# Patient Record
Sex: MALE | Race: WHITE | NOT HISPANIC OR LATINO | Employment: FULL TIME | URBAN - METROPOLITAN AREA
[De-identification: names, ages, dates, MRNs, and addresses within clinical notes are randomized per-mention and may not be internally consistent; named-entity substitution may affect disease eponyms.]

---

## 2017-12-18 ENCOUNTER — OFFICE VISIT (OUTPATIENT)
Dept: ORTHOPEDIC SURGERY | Facility: CLINIC | Age: 67
End: 2017-12-18

## 2017-12-18 VITALS
SYSTOLIC BLOOD PRESSURE: 127 MMHG | HEART RATE: 64 BPM | BODY MASS INDEX: 29.35 KG/M2 | DIASTOLIC BLOOD PRESSURE: 80 MMHG | HEIGHT: 70 IN | WEIGHT: 205 LBS

## 2017-12-18 DIAGNOSIS — R52 PAIN: Primary | ICD-10-CM

## 2017-12-18 DIAGNOSIS — M70.61 TROCHANTERIC BURSITIS OF RIGHT HIP: ICD-10-CM

## 2017-12-18 PROCEDURE — 99203 OFFICE O/P NEW LOW 30 MIN: CPT | Performed by: ORTHOPAEDIC SURGERY

## 2017-12-18 PROCEDURE — 20610 DRAIN/INJ JOINT/BURSA W/O US: CPT | Performed by: ORTHOPAEDIC SURGERY

## 2017-12-18 PROCEDURE — 72170 X-RAY EXAM OF PELVIS: CPT | Performed by: ORTHOPAEDIC SURGERY

## 2017-12-18 RX ORDER — TRIAMCINOLONE ACETONIDE 40 MG/ML
40 INJECTION, SUSPENSION INTRA-ARTICULAR; INTRAMUSCULAR
Status: COMPLETED | OUTPATIENT
Start: 2017-12-18 | End: 2017-12-18

## 2017-12-18 RX ORDER — LIDOCAINE HYDROCHLORIDE 10 MG/ML
4 INJECTION, SOLUTION INFILTRATION; PERINEURAL
Status: COMPLETED | OUTPATIENT
Start: 2017-12-18 | End: 2017-12-18

## 2017-12-18 RX ORDER — MELOXICAM 7.5 MG/1
7.5 TABLET ORAL DAILY
Qty: 30 TABLET | Refills: 5 | Status: SHIPPED | OUTPATIENT
Start: 2017-12-18 | End: 2022-08-17

## 2017-12-18 RX ADMIN — LIDOCAINE HYDROCHLORIDE 4 ML: 10 INJECTION, SOLUTION INFILTRATION; PERINEURAL at 14:48

## 2017-12-18 RX ADMIN — TRIAMCINOLONE ACETONIDE 40 MG: 40 INJECTION, SUSPENSION INTRA-ARTICULAR; INTRAMUSCULAR at 14:48

## 2017-12-18 NOTE — PROGRESS NOTES
Subjective: Right hip pain     Patient ID: Gilbert Jimenez is a 67 y.o. male.    Chief Complaint:    History of Present Illness 67-year-old male presents evaluation of his right hip is been symptomatic for the past several months.  No history of trauma but is noted persistent and recurring pain in the knee particularly lies on that right side with sleeping and with heavy activity.  Presents consistent concerned that he may have arthritis of the hip as he had a younger brother that both hips were placed.  He describes the pain as 2-4 out of 10 and doesn't jude some activities of daily living but is not totally disabled.  He is only taking occasional aspirin but no other medication for the discomfort       Social History     Occupational History   • Not on file.     Social History Main Topics   • Smoking status: Former Smoker     Quit date: 1975   • Smokeless tobacco: Never Used   • Alcohol use 2.4 oz/week     4 Cans of beer per week      Comment: daily   • Drug use: No   • Sexual activity: Defer      Review of Systems   Constitutional: Negative for chills, diaphoresis, fever and unexpected weight change.   HENT: Positive for tinnitus. Negative for hearing loss, nosebleeds and sore throat.    Eyes: Negative for pain and visual disturbance.   Respiratory: Negative for cough, shortness of breath and wheezing.    Cardiovascular: Positive for chest pain. Negative for palpitations.   Gastrointestinal: Negative for abdominal pain, diarrhea, nausea and vomiting.   Endocrine: Negative for cold intolerance, heat intolerance and polydipsia.   Genitourinary: Negative for difficulty urinating, dysuria and hematuria.   Musculoskeletal: Positive for arthralgias and myalgias. Negative for joint swelling.   Skin: Negative for rash and wound.   Allergic/Immunologic: Negative for environmental allergies.   Neurological: Negative for dizziness, syncope and numbness.   Hematological: Does not bruise/bleed easily.    Psychiatric/Behavioral: Negative for dysphoric mood and sleep disturbance. The patient is not nervous/anxious.          Past Medical History:   Diagnosis Date   • Fatigue      Past Surgical History:   Procedure Laterality Date   • KNEE ARTHROSCOPY Left 1988     Family History   Problem Relation Age of Onset   • Cancer Mother 77   • Cancer Father 66   • No Known Problems Brother    • No Known Problems Daughter    • No Known Problems Daughter          Objective:  Vitals:    12/18/17 1417   BP: 127/80   Pulse: 64     Last 3 weights    12/18/17  1417   Weight: 93 kg (205 lb)     Body mass index is 29.41 kg/(m^2).       Ortho Exam  an AP of the pelvis to evaluate his chief complaints completely within normal limits.  No prior x-rays for comparison.  There is alert and oriented ×3.  Head is normocephalic and sclerae are clear.  The right leg has no motor deficit with good distal pulses and no sensory loss.  There is a negative Stinchfield test.  No pain with passive internal/external rotation of the hip.  Negative straight leg raise sign.  Jaylyn test is negative.  His calf is nontender negative Homans.  Hip abductors are 5 over 5 but there is tenderness over the greater trochanter with abduction against resistance.  His skin is cool to touch and there is no warmth or erythema to palpation.  He is taking aspirin no GI side effects but is not taking any other anti-inflammatory medication or a nonsteroidal on a regular basis.    Assessment:       1. Pain    2. Trochanteric bursitis of right hip          Plan:Reviewed the x-rays and physical findings with the patient.  I assured him this is not osteoarthritis of his hip with trochanteric bursitis and I had a model in the room showing him the anatomy describing pathology involved.  Recommend beginning meloxicam on a daily basis and also after treatment options were proceed with a cortisone injection.  Patient rather try cortisone to try physical therapy.  Therefore after  sterile prep and underwent injection of 4 cc of lidocaine 1 cc Kenalog.  Postinjection instructions given to the patient.  Return to see me in 4 weeks.  Large Joint Arthrocentesis  Date/Time: 12/18/2017 2:48 PM  Consent given by: patient  Site marked: site marked  Timeout: Immediately prior to procedure a time out was called to verify the correct patient, procedure, equipment, support staff and site/side marked as required   Supporting Documentation  Indications: pain   Procedure Details  Location: hip - R hip joint  Preparation: Patient was prepped and draped in the usual sterile fashion  Needle size: 22 G  Approach: superior  Medications administered: 4 mL lidocaine 1 %; 40 mg triamcinolone acetonide 40 MG/ML                    Work Status:    BRITANY query complete.    Orders:  Orders Placed This Encounter   Procedures   • Large Joint Arthrocentesis   • XR Pelvis 1 or 2 View       Medications:  New Medications Ordered This Visit   Medications   • meloxicam (MOBIC) 7.5 MG tablet     Sig: Take 1 tablet by mouth Daily.     Dispense:  30 tablet     Refill:  5       Followup:  Return in about 4 weeks (around 1/15/2018).          Dragon transcription disclaimer     Much of this encounter note is an electronic transcription/translation of spoken language to printed text. The electronic translation of spoken language may permit erroneous, or at times, nonsensical words or phrases to be inadvertently transcribed. Although I have reviewed the note for such errors, some may still exist.

## 2018-01-15 ENCOUNTER — OFFICE VISIT (OUTPATIENT)
Dept: ORTHOPEDIC SURGERY | Facility: CLINIC | Age: 68
End: 2018-01-15

## 2018-01-15 VITALS — WEIGHT: 206 LBS | BODY MASS INDEX: 29.49 KG/M2 | HEIGHT: 70 IN

## 2018-01-15 DIAGNOSIS — R52 PAIN: Primary | ICD-10-CM

## 2018-01-15 DIAGNOSIS — M70.61 TROCHANTERIC BURSITIS OF RIGHT HIP: ICD-10-CM

## 2018-01-15 PROCEDURE — 99213 OFFICE O/P EST LOW 20 MIN: CPT | Performed by: ORTHOPAEDIC SURGERY

## 2018-01-15 NOTE — PROGRESS NOTES
Subjective: Right hip pain     Patient ID: Gilbert Jimenez is a 67 y.o. male.    Chief Complaint:    History of Present Illness 67-year-old male is seen back regarding his trochanteric bursitis.  Was started on meloxicam and is noticed significant reduction in his pain and discomfort on the medication       Social History     Occupational History   • Not on file.     Social History Main Topics   • Smoking status: Former Smoker     Quit date: 1975   • Smokeless tobacco: Never Used   • Alcohol use 2.4 oz/week     4 Cans of beer per week      Comment: daily   • Drug use: No   • Sexual activity: Defer      Review of Systems   Constitutional: Negative for chills, diaphoresis, fever and unexpected weight change.   HENT: Negative for hearing loss, nosebleeds, sore throat and tinnitus.    Eyes: Negative for pain and visual disturbance.   Respiratory: Negative for cough, shortness of breath and wheezing.    Cardiovascular: Negative for chest pain and palpitations.   Gastrointestinal: Negative for abdominal pain, diarrhea, nausea and vomiting.   Endocrine: Negative for cold intolerance, heat intolerance and polydipsia.   Genitourinary: Negative for difficulty urinating, dysuria and hematuria.   Musculoskeletal: Positive for arthralgias and myalgias. Negative for joint swelling.   Skin: Negative for rash and wound.   Allergic/Immunologic: Negative for environmental allergies.   Neurological: Negative for dizziness, syncope and numbness.   Hematological: Does not bruise/bleed easily.   Psychiatric/Behavioral: Negative for dysphoric mood and sleep disturbance. The patient is not nervous/anxious.          Past Medical History:   Diagnosis Date   • Fatigue      Past Surgical History:   Procedure Laterality Date   • KNEE ARTHROSCOPY Left 1988     Family History   Problem Relation Age of Onset   • Cancer Mother 77   • Cancer Father 66   • No Known Problems Brother    • No Known Problems Daughter    • No Known Problems Daughter           Objective:  There were no vitals filed for this visit.  Last 3 weights    01/15/18  1005   Weight: 93.4 kg (206 lb)     Body mass index is 29.56 kg/(m^2).       Ortho Exam  is alert and oriented ×3.  The right leg shows no motor deficit good distal pulses no sensory loss.  His calf is nontender with a negative Homans.  Hip abduction is 5 over 5 minimal tenderness over the greater trochanter.  No sensory loss.  Skin is cool to touch.  His been tolerating meloxicam without any GI side effects and with reduction of the pain and discomfort.  Is able to perform all activities of daily living without discomfort is quite pleased with the result at this point.    Assessment:       1. Pain    2. Trochanteric bursitis of right hip          Plan:      patient was advised to continue taking the meloxicam on a daily basis.  Return to see me when and if the pain should return despite taking meloxicam at that point he be a candidate for cortisone injection.  Discussed this treatment plan with the patient and he was in agreement.      Work Status:    BRITANY query complete.    Orders:  No orders of the defined types were placed in this encounter.      Medications:  No orders of the defined types were placed in this encounter.      Followup:  Return if symptoms worsen or fail to improve.          Dragon transcription disclaimer     Much of this encounter note is an electronic transcription/translation of spoken language to printed text. The electronic translation of spoken language may permit erroneous, or at times, nonsensical words or phrases to be inadvertently transcribed. Although I have reviewed the note for such errors, some may still exist.

## 2022-08-17 ENCOUNTER — OFFICE VISIT (OUTPATIENT)
Dept: INTERNAL MEDICINE | Facility: CLINIC | Age: 72
End: 2022-08-17

## 2022-08-17 VITALS
DIASTOLIC BLOOD PRESSURE: 80 MMHG | BODY MASS INDEX: 27.03 KG/M2 | SYSTOLIC BLOOD PRESSURE: 136 MMHG | OXYGEN SATURATION: 98 % | WEIGHT: 188.8 LBS | HEIGHT: 70 IN | TEMPERATURE: 96.8 F | HEART RATE: 65 BPM

## 2022-08-17 DIAGNOSIS — R79.9 ABNORMAL FINDING OF BLOOD CHEMISTRY, UNSPECIFIED: ICD-10-CM

## 2022-08-17 DIAGNOSIS — R53.83 OTHER FATIGUE: ICD-10-CM

## 2022-08-17 DIAGNOSIS — E66.3 OVERWEIGHT: ICD-10-CM

## 2022-08-17 DIAGNOSIS — R19.7 DIARRHEA, UNSPECIFIED TYPE: ICD-10-CM

## 2022-08-17 DIAGNOSIS — R63.4 WEIGHT LOSS, UNINTENTIONAL: Primary | ICD-10-CM

## 2022-08-17 DIAGNOSIS — L82.1 SEBORRHEIC KERATOSES: ICD-10-CM

## 2022-08-17 DIAGNOSIS — F32.1 CURRENT MODERATE EPISODE OF MAJOR DEPRESSIVE DISORDER WITHOUT PRIOR EPISODE: ICD-10-CM

## 2022-08-17 DIAGNOSIS — Z12.11 ENCOUNTER FOR SCREENING FOR MALIGNANT NEOPLASM OF COLON: ICD-10-CM

## 2022-08-17 DIAGNOSIS — R21 RASH AND NONSPECIFIC SKIN ERUPTION: ICD-10-CM

## 2022-08-17 PROCEDURE — 99205 OFFICE O/P NEW HI 60 MIN: CPT | Performed by: INTERNAL MEDICINE

## 2022-08-17 RX ORDER — ASPIRIN 81 MG/1
81 TABLET, CHEWABLE ORAL AS NEEDED
COMMUNITY

## 2022-08-17 RX ORDER — KETOCONAZOLE 20 MG/ML
SHAMPOO TOPICAL
Qty: 120 ML | Refills: 2 | Status: SHIPPED | OUTPATIENT
Start: 2022-08-17

## 2022-08-17 NOTE — ASSESSMENT & PLAN NOTE
- Every 1-2 weeks having rash that appears in the middle of the night. Occurring last 1 month.   - Awakens him from sleep.  Uses ice packs to provide relief, and cortisone, and benadryl tabs.   - Describes as red bumps and then areas of just erythema. Has associated burning sensation.  Usually disappears after 2-2.5 hours.  - Does have a history of skin cancer, but had removal of cancer.  No history of eczema.    - No laundry detergent changes.  No environmental changes that skin is exposed to.   - Once when rash came on, throat was contracted, but only once.   PLAN  - Will take pictures next time  - Will talk with wife about other possible triggers   - Offered prescription strength steroid, but declines today

## 2022-08-17 NOTE — ASSESSMENT & PLAN NOTE
- Some issues with making it to the bathroom  PLAN  - Cologuard ordered as no screening for colon cancer ever completed  - Fiber gummies with chicory recommended

## 2022-08-17 NOTE — ASSESSMENT & PLAN NOTE
- Gets lots of movement  - Actually concerned about unintentional weight loss  - Continue movement and will re-assess at next appointment

## 2022-08-17 NOTE — ASSESSMENT & PLAN NOTE
- Lost about 20 lbs in 1 year.  Has been having lack of appetite.  Food is not appealing.  No pain with eating.  - No stressors or infections preceding  - Early satiety, no notable bloating. No n/v/d. No night sweats.  No LAD.   - Some mild reflux symptoms   PLAN  - Lab evaluation today

## 2022-08-17 NOTE — PROGRESS NOTES
The ABCs of the Annual Wellness Visit  Subsequent Medicare Wellness Visit    Chief Complaint   Patient presents with   • Establish Care   • Annual Exam     Medicare Wellness      Subjective    History of Present Illness:  Gilbert Jimenez is a 72 y.o. male who presents for a Subsequent Medicare Wellness Visit.    Previously in the [employer] and worked at [employer] for years asa .     The following portions of the patient's history were reviewed and   updated as appropriate: allergies, current medications, past family history, past medical history, past social history, past surgical history and problem list.    Compared to one year ago, the patient feels his physical   health is the same.    Compared to one year ago, the patient feels his mental   health is worse.    Recent Hospitalizations:  He was not admitted to the hospital during the last year.       Current Medical Providers:  Patient Care Team:  Lorene Kelly MD as PCP - General (Internal Medicine)    Outpatient Medications Prior to Visit   Medication Sig Dispense Refill   • aspirin 81 MG chewable tablet Chew 81 mg Daily.     • meloxicam (MOBIC) 7.5 MG tablet Take 1 tablet by mouth Daily. 30 tablet 5     No facility-administered medications prior to visit.       No opioid medication identified on active medication list. I have reviewed chart for other potential  high risk medication/s and harmful drug interactions in the elderly.          Aspirin is not on active medication list.  Patient reports that he is taking aspirin.  .    Patient Active Problem List   Diagnosis   • Fatigue   • Chest pain   • Weight loss, unintentional   • Rash and nonspecific skin eruption   • Overweight   • Diarrhea   • Current moderate episode of major depressive disorder without prior episode (HCC)     Advance Care Planning  Advance Directive is not on file.  Patient with very complex current social set up and depression that dominated visit.  Will re-address at next  "visit.           Objective    Vitals:    22 1052   BP: 136/80   BP Location: Left arm   Patient Position: Sitting   Pulse: 65   Temp: 96.8 °F (36 °C)   SpO2: 98%   Weight: 85.6 kg (188 lb 12.8 oz)   Height: 177.8 cm (70\")     Estimated body mass index is 27.09 kg/m² as calculated from the following:    Height as of this encounter: 177.8 cm (70\").    Weight as of this encounter: 85.6 kg (188 lb 12.8 oz).    BMI is >= 25 and <30. (Overweight) The following options were offered after discussion;: Patient concerned about unintentional weight loss.       Does the patient have evidence of cognitive impairment? No    Physical Exam  Vitals reviewed.   Constitutional:       General: He is not in acute distress.     Appearance: Normal appearance.   HENT:      Head: Normocephalic and atraumatic.      Nose: Nose normal.      Mouth/Throat:      Mouth: Mucous membranes are moist.   Eyes:      Conjunctiva/sclera: Conjunctivae normal.   Cardiovascular:      Rate and Rhythm: Normal rate and regular rhythm.   Pulmonary:      Effort: Pulmonary effort is normal.      Breath sounds: Normal breath sounds.   Abdominal:      Palpations: Abdomen is soft.      Tenderness: There is no abdominal tenderness.      Comments: Difficult to tell, but thought I may have felt liver edge on palpation    Skin:     General: Skin is warm and dry.   Neurological:      General: No focal deficit present.      Mental Status: He is alert.   Psychiatric:         Mood and Affect: Mood normal.                 HEALTH RISK ASSESSMENT    Smoking Status:  Social History     Tobacco Use   Smoking Status Former Smoker   • Packs/day: 0.20   • Years: 6.00   • Pack years: 1.20   • Quit date:    • Years since quittin.6   Smokeless Tobacco Never Used     Alcohol Consumption:  Social History     Substance and Sexual Activity   Alcohol Use Yes   • Alcohol/week: 4.0 standard drinks   • Types: 4 Cans of beer per week    Comment: daily     Fall Risk " Screen:    FERNANDO Fall Risk Assessment was completed, and patient is at LOW risk for falls.Assessment completed on:8/17/2022    Depression Screening:  PHQ-2/PHQ-9 Depression Screening 8/17/2022   Little Interest or Pleasure in Doing Things 0-->not at all   Feeling Down, Depressed or Hopeless 0-->not at all   PHQ-9: Brief Depression Severity Measure Score 0       Health Habits and Functional and Cognitive Screening:  Functional & Cognitive Status 8/17/2022   Do you have difficulty preparing food and eating? No   Do you have difficulty bathing yourself, getting dressed or grooming yourself? No   Do you have difficulty using the toilet? No   Do you have difficulty moving around from place to place? No   Do you have trouble with steps or getting out of a bed or a chair? No   Current Diet Other        Current Diet Comment High protien   Dental Exam Up to date   Eye Exam Up to date   Exercise (times per week) 2 times per week   Current Exercises Include Yard Work   Do you need help using the phone?  No   Are you deaf or do you have serious difficulty hearing?  No   Do you need help with transportation? No   Do you need help shopping? No   Do you need help preparing meals?  No   Do you need help with housework?  No   Do you need help with laundry? No   Do you need help taking your medications? No   Do you need help managing money? No   Do you ever drive or ride in a car without wearing a seat belt? No   Have you felt unusual stress, anger or loneliness in the last month? Yes   Who do you live with? Spouse   If you need help, do you have trouble finding someone available to you? No   Have you been bothered in the last four weeks by sexual problems? No   Do you have difficulty concentrating, remembering or making decisions? No       Age-appropriate Screening Schedule:  Refer to the list below for future screening recommendations based on patient's age, sex and/or medical conditions. Orders for these recommended tests are  listed in the plan section. The patient has been provided with a written plan.    Health Maintenance   Topic Date Due   • TDAP/TD VACCINES (1 - Tdap) Never done   • ZOSTER VACCINE (1 of 2) Never done   • INFLUENZA VACCINE  10/01/2022              Assessment & Plan   CMS Preventative Services Quick Reference  Risk Factors Identified During Encounter  Cardiovascular Disease  Depression/Dysphoria  Inadequate Social Support, Isolation, Loneliness, Lack of Transportation, Financial Difficulties, or Caregiver Stress   The above risks/problems have been discussed with the patient.  Follow up actions/plans if indicated are seen below in the Assessment/Plan Section.  Pertinent information has been shared with the patient in the After Visit Summary.    Diagnoses and all orders for this visit:    1. Weight loss, unintentional (Primary)  Assessment & Plan:  - Lost about 20 lbs in 1 year.  Has been having lack of appetite.  Food is not appealing.  No pain with eating.  - No stressors or infections preceding  - Early satiety, no notable bloating. No n/v/d. No night sweats.  No LAD.   - Some mild reflux symptoms   PLAN  - Lab evaluation today    Orders:  -     Comprehensive metabolic panel; Future  -     CBC w AUTO Differential; Future    2. Rash and nonspecific skin eruption  Assessment & Plan:  - Every 1-2 weeks having rash that appears in the middle of the night. Occurring last 1 month.   - Awakens him from sleep.  Uses ice packs to provide relief, and cortisone, and benadryl tabs.   - Describes as red bumps and then areas of just erythema. Has associated burning sensation.  Usually disappears after 2-2.5 hours.  - Does have a history of skin cancer, but had removal of cancer.  No history of eczema.    - No laundry detergent changes.  No environmental changes that skin is exposed to.   - Once when rash came on, throat was contracted, but only once.   PLAN  - Will take pictures next time  - Will talk with wife about other  possible triggers   - Offered prescription strength steroid, but declines today     Orders:  -     Hemoglobin A1c; Future    3. Current moderate episode of major depressive disorder without prior episode (HCC)  Assessment & Plan:  - Lots of stressors with big routine change  - Wife with dementia who he is sole caregiver  - Dementia has created riff between daughter and wife  PLAN  - Genesight testing ordered  - F/u in 3 months for consideration of anti-depressant  - Discussed alcohol as self medication with daughter and him present  - Good conversation about mental health and its importance.     Orders:  -     GeneSight - Swab,; Future    4. Overweight  Assessment & Plan:  - Gets lots of movement  - Actually concerned about unintentional weight loss  - Continue movement and will re-assess at next appointment     Orders:  -     Hemoglobin A1c; Future  -     Lipid panel; Future    5. Diarrhea, unspecified type  Assessment & Plan:  - Some issues with making it to the bathroom  PLAN  - Cologuard ordered as no screening for colon cancer ever completed  - Fiber gummies with chicory recommended    Orders:  -     Cologuard - Stool, Per Rectum; Future    6. Abnormal finding of blood chemistry, unspecified   -     Hemoglobin A1c; Future    7. Encounter for screening for malignant neoplasm of colon   -     Cologuard - Stool, Per Rectum; Future    8. Other fatigue  Assessment & Plan:  - Sleeping more than prior  - May be related to depression vs. Alcohol use      9. Seborrheic keratoses  -     ketoconazole (NIZORAL) 2 % shampoo; Use shampoo on hair and eyebrows 1-2 times per week.  Dispense: 120 mL; Refill: 2      Follow Up:   Return in about 3 months (around 11/17/2022) for mood check in, further lab/imaging work up .     An After Visit Summary and PPPS were made available to the patient.          I spent 80 minutes caring for Gilbert on this date of service. This time includes time spent by me in the following  activities:preparing for the visit, reviewing tests, performing a medically appropriate examination and/or evaluation , counseling and educating the patient/family/caregiver, ordering medications, tests, or procedures and documenting information in the medical record

## 2022-08-17 NOTE — ASSESSMENT & PLAN NOTE
- Lots of stressors with big routine change  - Wife with dementia who he is sole caregiver  - Dementia has created riff between daughter and wife  PLAN  - Genesight testing ordered  - F/u in 3 months for consideration of anti-depressant  - Discussed alcohol as self medication with daughter and him present  - Good conversation about mental health and its importance.

## 2022-08-18 DIAGNOSIS — R21 RASH AND NONSPECIFIC SKIN ERUPTION: ICD-10-CM

## 2022-08-18 DIAGNOSIS — R63.4 WEIGHT LOSS, UNINTENTIONAL: ICD-10-CM

## 2022-08-18 DIAGNOSIS — R79.9 ABNORMAL FINDING OF BLOOD CHEMISTRY, UNSPECIFIED: ICD-10-CM

## 2022-08-18 DIAGNOSIS — E66.3 OVERWEIGHT: ICD-10-CM

## 2022-08-18 DIAGNOSIS — F32.1 CURRENT MODERATE EPISODE OF MAJOR DEPRESSIVE DISORDER WITHOUT PRIOR EPISODE: ICD-10-CM

## 2022-08-19 ENCOUNTER — TELEPHONE (OUTPATIENT)
Dept: INTERNAL MEDICINE | Facility: CLINIC | Age: 72
End: 2022-08-19

## 2022-08-19 DIAGNOSIS — R63.4 WEIGHT LOSS, UNINTENTIONAL: Primary | ICD-10-CM

## 2022-08-19 DIAGNOSIS — R74.01 TRANSAMINITIS: ICD-10-CM

## 2022-08-19 LAB
ALBUMIN SERPL-MCNC: 4.3 G/DL (ref 3.7–4.7)
ALBUMIN/GLOB SERPL: 2 {RATIO} (ref 1.2–2.2)
ALP SERPL-CCNC: 88 IU/L (ref 44–121)
ALT SERPL-CCNC: 99 IU/L (ref 0–44)
AST SERPL-CCNC: 158 IU/L (ref 0–40)
BASOPHILS # BLD AUTO: 0.1 X10E3/UL (ref 0–0.2)
BASOPHILS NFR BLD AUTO: 1 %
BILIRUB SERPL-MCNC: 2.5 MG/DL (ref 0–1.2)
BUN SERPL-MCNC: 7 MG/DL (ref 8–27)
BUN/CREAT SERPL: 8 (ref 10–24)
CALCIUM SERPL-MCNC: 8.9 MG/DL (ref 8.6–10.2)
CHLORIDE SERPL-SCNC: 96 MMOL/L (ref 96–106)
CHOLEST SERPL-MCNC: 159 MG/DL (ref 100–199)
CO2 SERPL-SCNC: 18 MMOL/L (ref 20–29)
CREAT SERPL-MCNC: 0.88 MG/DL (ref 0.76–1.27)
EGFRCR-CYS SERPLBLD CKD-EPI 2021: 91 ML/MIN/1.73
EOSINOPHIL # BLD AUTO: 0.1 X10E3/UL (ref 0–0.4)
EOSINOPHIL NFR BLD AUTO: 1 %
ERYTHROCYTE [DISTWIDTH] IN BLOOD BY AUTOMATED COUNT: 12.3 % (ref 11.6–15.4)
GLOBULIN SER CALC-MCNC: 2.1 G/DL (ref 1.5–4.5)
GLUCOSE SERPL-MCNC: 139 MG/DL (ref 65–99)
HBA1C MFR BLD: 5.3 % (ref 4.8–5.6)
HCT VFR BLD AUTO: 42.7 % (ref 37.5–51)
HDLC SERPL-MCNC: 90 MG/DL
HGB BLD-MCNC: 15.5 G/DL (ref 13–17.7)
IMM GRANULOCYTES # BLD AUTO: 0 X10E3/UL (ref 0–0.1)
IMM GRANULOCYTES NFR BLD AUTO: 0 %
LDLC SERPL CALC-MCNC: 46 MG/DL (ref 0–99)
LYMPHOCYTES # BLD AUTO: 1.4 X10E3/UL (ref 0.7–3.1)
LYMPHOCYTES NFR BLD AUTO: 24 %
MCH RBC QN AUTO: 38.1 PG (ref 26.6–33)
MCHC RBC AUTO-ENTMCNC: 36.3 G/DL (ref 31.5–35.7)
MCV RBC AUTO: 105 FL (ref 79–97)
MONOCYTES # BLD AUTO: 0.7 X10E3/UL (ref 0.1–0.9)
MONOCYTES NFR BLD AUTO: 11 %
NEUTROPHILS # BLD AUTO: 3.8 X10E3/UL (ref 1.4–7)
NEUTROPHILS NFR BLD AUTO: 63 %
PLATELET # BLD AUTO: 155 X10E3/UL (ref 150–450)
POTASSIUM SERPL-SCNC: 3.7 MMOL/L (ref 3.5–5.2)
PROT SERPL-MCNC: 6.4 G/DL (ref 6–8.5)
RBC # BLD AUTO: 4.07 X10E6/UL (ref 4.14–5.8)
SODIUM SERPL-SCNC: 132 MMOL/L (ref 134–144)
TRIGL SERPL-MCNC: 139 MG/DL (ref 0–149)
VLDLC SERPL CALC-MCNC: 23 MG/DL (ref 5–40)
WBC # BLD AUTO: 6 X10E3/UL (ref 3.4–10.8)

## 2022-08-19 NOTE — TELEPHONE ENCOUNTER
Caller: Kenna Jimenez    Relationship to patient: Emergency Contact    Best call back number: [phone]    Patient is needing: DAUGHTER (NOT ON VERBAL) STATES THE DR LOCO CALLED HER AND SHE MISSED THE CALL. DAUGHTER RETURNED CALL, HUB ATTEMPTED WARM TRANSFER. UNSUCCESSFUL   PLEASE ADVISE

## 2022-08-22 ENCOUNTER — TELEPHONE (OUTPATIENT)
Dept: INTERNAL MEDICINE | Facility: CLINIC | Age: 72
End: 2022-08-22

## 2022-08-22 NOTE — TELEPHONE ENCOUNTER
Caller: Gilbert Jimenez    Relationship: Self    Best call back number: [phone]    Do you know the name of the person who called: DR LOCO    What was the call regarding: PATIENT WAS RETURNING DR LOCO'S PHONE CALL REGARDING TEST RESULTS.     Do you require a callback: YES, ATTEMPTED WARM TRANSFER, NO ANSWER.

## 2022-08-22 NOTE — TELEPHONE ENCOUNTER
Called on Friday and today to discuss labs and ask Mr. Jimenez to obtain repeats of liver function to insure it is stable and to get liver ultrasound.  I will send a [employer] message to try and reach him at this time.

## 2022-08-22 NOTE — TELEPHONE ENCOUNTER
Caller: Gilbert Jimenez    Relationship: Self    Best call back number: [phone]    What is the best time to reach you: ANY    Who are you requesting to speak with (clinical staff, provider,  specific staff member): CLINICAL    Do you know the name of the person who called: PATIENT    What was the call regarding: PATIENT RETURNING CALL OF Friday.    ATTEMPTED A WARM TRANSFER, UNSUCCESSFUL    Do you require a callback: YES

## 2022-09-02 ENCOUNTER — HOSPITAL ENCOUNTER (OUTPATIENT)
Dept: ULTRASOUND IMAGING | Facility: HOSPITAL | Age: 72
Discharge: HOME | End: 2022-09-02
Admitting: INTERNAL MEDICINE

## 2022-09-02 DIAGNOSIS — R74.01 TRANSAMINITIS: ICD-10-CM

## 2022-09-02 PROCEDURE — 76705 ECHO EXAM OF ABDOMEN: CPT

## 2022-11-18 ENCOUNTER — OFFICE VISIT (OUTPATIENT)
Dept: INTERNAL MEDICINE | Facility: CLINIC | Age: 72
End: 2022-11-18

## 2022-11-18 VITALS
DIASTOLIC BLOOD PRESSURE: 72 MMHG | HEIGHT: 70 IN | BODY MASS INDEX: 27.63 KG/M2 | SYSTOLIC BLOOD PRESSURE: 126 MMHG | TEMPERATURE: 97.8 F | OXYGEN SATURATION: 98 % | WEIGHT: 193 LBS | HEART RATE: 61 BPM

## 2022-11-18 DIAGNOSIS — R21 RASH AND NONSPECIFIC SKIN ERUPTION: ICD-10-CM

## 2022-11-18 DIAGNOSIS — F32.1 CURRENT MODERATE EPISODE OF MAJOR DEPRESSIVE DISORDER WITHOUT PRIOR EPISODE: Primary | ICD-10-CM

## 2022-11-18 DIAGNOSIS — Z63.6 CAREGIVER STRESS: ICD-10-CM

## 2022-11-18 DIAGNOSIS — K76.0 STEATOSIS OF LIVER: ICD-10-CM

## 2022-11-18 DIAGNOSIS — R74.01 TRANSAMINITIS: ICD-10-CM

## 2022-11-18 DIAGNOSIS — F10.10 ALCOHOL ABUSE: ICD-10-CM

## 2022-11-18 PROCEDURE — 99214 OFFICE O/P EST MOD 30 MIN: CPT | Performed by: INTERNAL MEDICINE

## 2022-11-18 SDOH — SOCIAL STABILITY - SOCIAL INSECURITY: DEPENDENT RELATIVE NEEDING CARE AT HOME: Z63.6

## 2022-11-18 NOTE — ASSESSMENT & PLAN NOTE
- repeat CMP and INR today to assess liver function  - Liver u/s showed steatosis without cirrhosis

## 2022-11-18 NOTE — ASSESSMENT & PLAN NOTE
- Feels like symptoms have improved overall, but still struggling with [facility]  - Has cut back on drinking.  Now doing 4 beers/day instead of 6-8  - Would still like some time to work on his own with depression symptoms.  Will f/u closely in 3 months  - Discussed a schedule, hobbies like puzzles, or volunteer work to keep his mind busy and help with symptoms

## 2022-11-18 NOTE — PROGRESS NOTES
"      Gilbert Jimenez is a 72 y.o. male who presents with a chief complaint of   Chief Complaint   Patient presents with   • Depression     3 month follow up       HPI     Answers for HPI/ROS submitted by the patient on 11/12/2022  What is the primary reason for your visit?: Other  Please describe your symptoms.: I don't know why this visit has been scheduled.  Have you had these symptoms before?: No  How long have you been having these symptoms?: Greater than 2 weeks    COVID x 3--> May 2022, October and December, but he will bring card next time he comes to clinic.     The following portions of the patient's history were reviewed and updated as appropriate: allergies, current medications, past family history, past medical history, past social history, past surgical history and problem list.      Current Outpatient Medications:   •  aspirin 81 MG chewable tablet, Chew 81 mg As Needed., Disp: , Rfl:   •  ketoconazole (NIZORAL) 2 % shampoo, Use shampoo on hair and eyebrows 1-2 times per week., Disp: 120 mL, Rfl: 2            Physical Exam  /72 (BP Location: Left arm, Patient Position: Sitting, Cuff Size: Adult)   Pulse 61   Temp 97.8 °F (36.6 °C)   Ht 177.8 cm (70\")   Wt 87.5 kg (193 lb)   SpO2 98%   BMI 27.69 kg/m²     Physical Exam  Vitals reviewed.   Constitutional:       General: He is not in acute distress.     Appearance: Normal appearance.   HENT:      Head: Normocephalic and atraumatic.      Nose: Nose normal.      Mouth/Throat:      Mouth: Mucous membranes are moist.   Eyes:      Conjunctiva/sclera: Conjunctivae normal.   Cardiovascular:      Rate and Rhythm: Normal rate and regular rhythm.      Pulses: Normal pulses.      Heart sounds: Normal heart sounds.   Pulmonary:      Effort: Pulmonary effort is normal.      Breath sounds: Normal breath sounds.   Abdominal:      Palpations: Abdomen is soft.      Tenderness: There is no abdominal tenderness.   Musculoskeletal:      Right lower leg: No edema. "      Left lower leg: No edema.   Skin:     General: Skin is warm and dry.   Neurological:      General: No focal deficit present.      Mental Status: He is alert.   Psychiatric:         Mood and Affect: Mood normal.           Results for orders placed or performed in visit on 08/24/22   Comprehensive metabolic panel    Specimen: Blood   Result Value Ref Range    Glucose 104 (H) 65 - 99 mg/dL    BUN 6 (L) 8 - 27 mg/dL    Creatinine 1.13 0.76 - 1.27 mg/dL    EGFR Result 69 >59 mL/min/1.73    BUN/Creatinine Ratio 5 (L) 10 - 24    Sodium 141 134 - 144 mmol/L    Potassium 3.8 3.5 - 5.2 mmol/L    Chloride 102 96 - 106 mmol/L    Total CO2 22 20 - 29 mmol/L    Calcium 9.9 8.6 - 10.2 mg/dL    Total Protein 6.8 6.0 - 8.5 g/dL    Albumin 4.6 3.7 - 4.7 g/dL    Globulin 2.2 1.5 - 4.5 g/dL    A/G Ratio 2.1 1.2 - 2.2    Total Bilirubin 2.0 (H) 0.0 - 1.2 mg/dL    Alkaline Phosphatase 74 44 - 121 IU/L    AST (SGOT) 134 (H) 0 - 40 IU/L    ALT (SGPT) 91 (H) 0 - 44 IU/L   Protime-INR    Specimen: Blood   Result Value Ref Range    INR 1.0 0.9 - 1.2    Protime 11.0 9.1 - 12.0 sec           Diagnoses and all orders for this visit:    1. Current moderate episode of major depressive disorder without prior episode (HCC) (Primary)  Assessment & Plan:  - Feels like symptoms have improved overall, but still struggling with halfway  - Has cut back on drinking.  Now doing 4 beers/day instead of 6-8  - Would still like some time to work on his own with depression symptoms.  Will f/u closely in 3 months  - Discussed a schedule, hobbies like puzzles, or volunteer work to keep his mind busy and help with symptoms      2. Transaminitis  Assessment & Plan:  - repeat CMP today     Orders:  -     Comprehensive Metabolic Panel  -     Protime-INR    3. Alcohol abuse  Assessment & Plan:  - Improvement since we last saw each other  - Advised that he needs to stop drinking   - Praised for cutting back and encouraged him to cut down to 2 beers per day from  4 before I see him in 3 months  - Discussed possibility of medication for drinking, but he is not interested in this at this time      4. Caregiver stress  Assessment & Plan:  - Discussed huge challenges of caring for wife with dementia        5. Rash and nonspecific skin eruption  Assessment & Plan:  - Has not returned will continue to f/u       6. Steatosis of liver  Assessment & Plan:  - repeat CMP and INR today to assess liver function  - Liver u/s showed steatosis without cirrhosis    Orders:  -     Protime-INR

## 2022-11-18 NOTE — ASSESSMENT & PLAN NOTE
- Improvement since we last saw each other  - Advised that he needs to stop drinking   - Praised for cutting back and encouraged him to cut down to 2 beers per day from 4 before I see him in 3 months  - Discussed possibility of medication for drinking, but he is not interested in this at this time

## 2022-11-19 LAB
ALBUMIN SERPL-MCNC: 4.1 G/DL (ref 3.5–5.2)
ALBUMIN/GLOB SERPL: 1.7 G/DL
ALP SERPL-CCNC: 63 U/L (ref 39–117)
ALT SERPL-CCNC: 41 U/L (ref 1–41)
AST SERPL-CCNC: 46 U/L (ref 1–40)
BILIRUB SERPL-MCNC: 1.3 MG/DL (ref 0–1.2)
BUN SERPL-MCNC: 8 MG/DL (ref 8–23)
BUN/CREAT SERPL: 8.5 (ref 7–25)
CALCIUM SERPL-MCNC: 9.2 MG/DL (ref 8.6–10.5)
CHLORIDE SERPL-SCNC: 104 MMOL/L (ref 98–107)
CO2 SERPL-SCNC: 23.1 MMOL/L (ref 22–29)
CREAT SERPL-MCNC: 0.94 MG/DL (ref 0.76–1.27)
EGFRCR SERPLBLD CKD-EPI 2021: 86.1 ML/MIN/1.73
GLOBULIN SER CALC-MCNC: 2.4 GM/DL
GLUCOSE SERPL-MCNC: 87 MG/DL (ref 65–99)
INR PPP: 1.1 (ref 0.9–1.1)
POTASSIUM SERPL-SCNC: 3.7 MMOL/L (ref 3.5–5.2)
PROT SERPL-MCNC: 6.5 G/DL (ref 6–8.5)
PROTHROMBIN TIME: 14.3 SECONDS (ref 11.7–14.2)
SODIUM SERPL-SCNC: 141 MMOL/L (ref 136–145)

## 2023-02-06 ENCOUNTER — TELEPHONE (OUTPATIENT)
Dept: FAMILY MEDICINE CLINIC | Facility: CLINIC | Age: 73
End: 2023-02-06

## 2023-10-11 ENCOUNTER — OFFICE VISIT (OUTPATIENT)
Dept: INTERNAL MEDICINE | Facility: CLINIC | Age: 73
End: 2023-10-11
Payer: MEDICARE

## 2023-10-11 VITALS
DIASTOLIC BLOOD PRESSURE: 88 MMHG | TEMPERATURE: 97.3 F | BODY MASS INDEX: 26.26 KG/M2 | HEIGHT: 70 IN | SYSTOLIC BLOOD PRESSURE: 160 MMHG | WEIGHT: 183.4 LBS | HEART RATE: 56 BPM | OXYGEN SATURATION: 100 %

## 2023-10-11 DIAGNOSIS — R74.01 TRANSAMINITIS: ICD-10-CM

## 2023-10-11 DIAGNOSIS — Z00.00 MEDICARE ANNUAL WELLNESS VISIT, INITIAL: Primary | ICD-10-CM

## 2023-10-11 DIAGNOSIS — Z86.39 H/O: OBESITY: ICD-10-CM

## 2023-10-11 DIAGNOSIS — Z13.1 SCREENING FOR DIABETES MELLITUS: ICD-10-CM

## 2023-10-11 DIAGNOSIS — Z13.0 SCREENING FOR DEFICIENCY ANEMIA: ICD-10-CM

## 2023-10-11 DIAGNOSIS — M79.671 PAIN IN BOTH FEET: ICD-10-CM

## 2023-10-11 DIAGNOSIS — K76.0 STEATOSIS OF LIVER: ICD-10-CM

## 2023-10-11 DIAGNOSIS — Z13.220 SCREENING FOR HYPERLIPIDEMIA: ICD-10-CM

## 2023-10-11 DIAGNOSIS — M79.672 PAIN IN BOTH FEET: ICD-10-CM

## 2023-10-11 PROBLEM — R03.0 ELEVATED BLOOD PRESSURE READING: Status: ACTIVE | Noted: 2023-10-11

## 2023-10-11 PROCEDURE — G0438 PPPS, INITIAL VISIT: HCPCS | Performed by: INTERNAL MEDICINE

## 2023-10-11 PROCEDURE — 1159F MED LIST DOCD IN RCRD: CPT | Performed by: INTERNAL MEDICINE

## 2023-10-11 PROCEDURE — 1170F FXNL STATUS ASSESSED: CPT | Performed by: INTERNAL MEDICINE

## 2023-10-11 PROCEDURE — 1160F RVW MEDS BY RX/DR IN RCRD: CPT | Performed by: INTERNAL MEDICINE

## 2023-10-11 NOTE — PROGRESS NOTES
The ABCs of the Annual Wellness Visit  Initial Medicare Wellness Visit    Chief Complaint   Patient presents with    Medicare Wellness-Initial Visit     Subjective   History of Present Illness:  Gilbert Jimenez is a 73 y.o. male who presents for an Initial Medicare Wellness Visit.  He does have a blood pressure that belongs to his wife.     The following portions of the patient's history were reviewed and   updated as appropriate: allergies, current medications, past family history, past medical history, past social history, past surgical history, and problem list.     Compared to one year ago, the patient feels his physical   health is better.    Compared to one year ago, the patient feels his mental   health is better.    Recent Hospitalizations:  He was not admitted to the hospital during the last year.       Current Medical Providers:  Patient Care Team:  Lorene Kelly MD as PCP - General (Internal Medicine)    Outpatient Medications Prior to Visit   Medication Sig Dispense Refill    aspirin 81 MG chewable tablet Chew 1 tablet As Needed.      ketoconazole (NIZORAL) 2 % shampoo Use shampoo on hair and eyebrows 1-2 times per week. 120 mL 2     No facility-administered medications prior to visit.       No opioid medication identified on active medication list. I have reviewed chart for other potential  high risk medication/s and harmful drug interactions in the elderly.        Aspirin is on active medication list. Aspirin use is indicated based on review of current medical condition/s. Pros and cons of this therapy have been discussed today. Benefits of this medication outweigh potential harm.  Patient has been encouraged to continue taking this medication.  .      Patient Active Problem List   Diagnosis    Fatigue    Chest pain    Weight loss, unintentional    Rash and nonspecific skin eruption    Overweight    Diarrhea    Current moderate episode of major depressive disorder without prior episode    Alcohol  "abuse    Caregiver stress    Transaminitis    Steatosis of liver    Elevated blood pressure reading     Advance Care Planning  Advance Directive is not on file.  ACP discussion was held with the patient during this visit. All is set up at this point he believes in terms of advance directive and living will.           Objective       Vitals:    10/11/23 0831   BP: 160/88   BP Location: Left arm   Patient Position: Sitting   Cuff Size: Adult   Pulse: 56   Temp: 97.3 øF (36.3 øC)   TempSrc: Infrared   SpO2: 100%   Weight: 83.2 kg (183 lb 6.4 oz)   Height: 177.8 cm (70\")     Estimated body mass index is 26.32 kg/mý as calculated from the following:    Height as of this encounter: 177.8 cm (70\").    Weight as of this encounter: 83.2 kg (183 lb 6.4 oz).    BMI is >= 25 and <30. (Overweight) The following options were offered after discussion;: exercise counseling/recommendations and nutrition counseling/recommendations      Does the patient have evidence of cognitive impairment? No    Physical Exam  Vitals reviewed.   Constitutional:       General: He is not in acute distress.     Appearance: Normal appearance.   HENT:      Head: Normocephalic and atraumatic.      Nose: Nose normal.      Mouth/Throat:      Mouth: Mucous membranes are moist.   Eyes:      Conjunctiva/sclera: Conjunctivae normal.   Cardiovascular:      Rate and Rhythm: Normal rate and regular rhythm.      Pulses: Normal pulses.      Heart sounds: Normal heart sounds.   Pulmonary:      Effort: Pulmonary effort is normal.      Breath sounds: Normal breath sounds.   Abdominal:      Palpations: Abdomen is soft.      Tenderness: There is no abdominal tenderness.   Musculoskeletal:      Right lower leg: No edema.      Left lower leg: No edema.   Skin:     General: Skin is warm and dry.   Neurological:      General: No focal deficit present.      Mental Status: He is alert.   Psychiatric:         Mood and Affect: Mood normal.               HEALTH RISK " ASSESSMENT    Smoking Status:  Social History     Tobacco Use   Smoking Status Former    Packs/day: 0.20    Years: 6.00    Additional pack years: 0.00    Total pack years: 1.20    Types: Cigarettes    Quit date:     Years since quittin.8   Smokeless Tobacco Never     Alcohol Consumption:  Social History     Substance and Sexual Activity   Alcohol Use Yes    Alcohol/week: 4.0 standard drinks of alcohol    Types: 4 Cans of beer per week    Comment: daily     Fall Risk Screen:    FERNANDO Fall Risk Assessment was completed, and patient is at HIGH risk for falls. Assessment completed on:10/11/2023    Depression Screen:       10/11/2023     8:35 AM   PHQ-2/PHQ-9 Depression Screening   Little Interest or Pleasure in Doing Things 0-->not at all   Feeling Down, Depressed or Hopeless 0-->not at all   PHQ-9: Brief Depression Severity Measure Score 0       Health Habits and Functional and Cognitive Screening:      10/11/2023     8:32 AM   Functional & Cognitive Status   Do you have difficulty preparing food and eating? No   Do you have difficulty bathing yourself, getting dressed or grooming yourself? No   Do you have difficulty using the toilet? No   Do you have difficulty moving around from place to place? No   Do you have trouble with steps or getting out of a bed or a chair? No   Current Diet Limited Junk Food   Dental Exam Up to date   Eye Exam Up to date   Current Exercises Include Yard Work   Do you need help using the phone?  No   Are you deaf or do you have serious difficulty hearing?  No   Do you need help to go to places out of walking distance? No   Do you need help shopping? No   Do you need help preparing meals?  No   Do you need help with housework?  No   Do you need help with laundry? No   Do you need help taking your medications? No   Do you need help managing money? No   Do you ever drive or ride in a car without wearing a seat belt? No   Have you felt unusual stress, anger or loneliness in the last  month? No   Who do you live with? Spouse   If you need help, do you have trouble finding someone available to you? No   Have you been bothered in the last four weeks by sexual problems? No   Do you have difficulty concentrating, remembering or making decisions? No       Age-appropriate Screening Schedule:  Refer to the list below for future screening recommendations based on patient's age, sex and/or medical conditions. Orders for these recommended tests are listed in the plan section. The patient has been provided with a written plan.    Health Maintenance   Topic Date Due    BMI FOLLOWUP  Never done    ZOSTER VACCINE (1 of 2) Never done    HEPATITIS C SCREENING  Never done    ANNUAL WELLNESS VISIT  Never done    AAA SCREEN (ONE-TIME)  Never done    INFLUENZA VACCINE  Never done    COVID-19 Vaccine (2 - 2023-24 season) 09/01/2023    Pneumococcal Vaccine 65+ (1 - PCV) 11/18[birth year]2023 (Originally 2/16/1956)    TDAP/TD VACCINES (1 - Tdap) 11/18/2023 (Originally 2/16/1969)    COLORECTAL CANCER SCREENING  08/23/2025            Assessment & Plan   CMS Preventative Services Quick Reference  Risk Factors Identified During Encounter  Alcohol Misuse:  has had significant improvement it sounds like in terms of amount he is drinking and he is feeling better over all   The above risks/problems have been discussed with the patient.  Follow up actions/plans if indicated are seen below in the Assessment/Plan Section.  Pertinent information has been shared with the patient in the After Visit Summary.    Diagnoses and all orders for this visit:    1. Medicare annual wellness visit, initial (Primary)    2. Transaminitis  -     Comprehensive Metabolic Panel    3. Steatosis of liver  -     Comprehensive Metabolic Panel    4. Screening for diabetes mellitus  -     Comprehensive Metabolic Panel  -     Hemoglobin A1c    5. Screening for hyperlipidemia  -     Lipid Panel With LDL / HDL Ratio    6. Screening for deficiency anemia  -     CBC &  Differential    7. H/O: obesity  -     Hemoglobin A1c    8. Pain in both feet  -     EMG 2 Limbs        Follow Up:  Return in about 6 months (around 4/11/2024) for f/u elevated BP.     Repeat /85.  Will track at home.     An After Visit Summary and PPPS were made available to the patient.

## 2023-10-12 LAB
ALBUMIN SERPL-MCNC: 4.7 G/DL (ref 3.5–5.2)
ALBUMIN/GLOB SERPL: 2.1 G/DL
ALP SERPL-CCNC: 70 U/L (ref 39–117)
ALT SERPL-CCNC: 55 U/L (ref 1–41)
AST SERPL-CCNC: 53 U/L (ref 1–40)
BASOPHILS # BLD AUTO: 0.05 10*3/MM3 (ref 0–0.2)
BASOPHILS NFR BLD AUTO: 0.5 % (ref 0–1.5)
BILIRUB SERPL-MCNC: 2 MG/DL (ref 0–1.2)
BUN SERPL-MCNC: 7 MG/DL (ref 8–23)
BUN/CREAT SERPL: 6.9 (ref 7–25)
CALCIUM SERPL-MCNC: 9.9 MG/DL (ref 8.6–10.5)
CHLORIDE SERPL-SCNC: 104 MMOL/L (ref 98–107)
CHOLEST SERPL-MCNC: 154 MG/DL (ref 0–200)
CO2 SERPL-SCNC: 22.9 MMOL/L (ref 22–29)
CREAT SERPL-MCNC: 1.02 MG/DL (ref 0.76–1.27)
EGFRCR SERPLBLD CKD-EPI 2021: 77.6 ML/MIN/1.73
EOSINOPHIL # BLD AUTO: 0.1 10*3/MM3 (ref 0–0.4)
EOSINOPHIL NFR BLD AUTO: 1.1 % (ref 0.3–6.2)
ERYTHROCYTE [DISTWIDTH] IN BLOOD BY AUTOMATED COUNT: 12.2 % (ref 12.3–15.4)
GLOBULIN SER CALC-MCNC: 2.2 GM/DL
GLUCOSE SERPL-MCNC: 97 MG/DL (ref 65–99)
HBA1C MFR BLD: 5.3 % (ref 4.8–5.6)
HCT VFR BLD AUTO: 46.2 % (ref 37.5–51)
HDLC SERPL-MCNC: 87 MG/DL (ref 40–60)
HGB BLD-MCNC: 16.2 G/DL (ref 13–17.7)
IMM GRANULOCYTES # BLD AUTO: 0.02 10*3/MM3 (ref 0–0.05)
IMM GRANULOCYTES NFR BLD AUTO: 0.2 % (ref 0–0.5)
LDLC SERPL CALC-MCNC: 47 MG/DL (ref 0–100)
LDLC/HDLC SERPL: 0.49 {RATIO}
LYMPHOCYTES # BLD AUTO: 2.17 10*3/MM3 (ref 0.7–3.1)
LYMPHOCYTES NFR BLD AUTO: 23.3 % (ref 19.6–45.3)
MCH RBC QN AUTO: 35.4 PG (ref 26.6–33)
MCHC RBC AUTO-ENTMCNC: 35.1 G/DL (ref 31.5–35.7)
MCV RBC AUTO: 101.1 FL (ref 79–97)
MONOCYTES # BLD AUTO: 0.84 10*3/MM3 (ref 0.1–0.9)
MONOCYTES NFR BLD AUTO: 9 % (ref 5–12)
NEUTROPHILS # BLD AUTO: 6.13 10*3/MM3 (ref 1.7–7)
NEUTROPHILS NFR BLD AUTO: 65.9 % (ref 42.7–76)
NRBC BLD AUTO-RTO: 0 /100 WBC (ref 0–0.2)
PLATELET # BLD AUTO: 235 10*3/MM3 (ref 140–450)
POTASSIUM SERPL-SCNC: 3.8 MMOL/L (ref 3.5–5.2)
PROT SERPL-MCNC: 6.9 G/DL (ref 6–8.5)
RBC # BLD AUTO: 4.57 10*6/MM3 (ref 4.14–5.8)
SODIUM SERPL-SCNC: 140 MMOL/L (ref 136–145)
TRIGL SERPL-MCNC: 120 MG/DL (ref 0–150)
VLDLC SERPL CALC-MCNC: 20 MG/DL (ref 5–40)
WBC # BLD AUTO: 9.31 10*3/MM3 (ref 3.4–10.8)

## 2024-04-11 ENCOUNTER — OFFICE VISIT (OUTPATIENT)
Dept: INTERNAL MEDICINE | Facility: CLINIC | Age: 74
End: 2024-04-11
Payer: MEDICARE

## 2024-04-11 VITALS
BODY MASS INDEX: 28.92 KG/M2 | SYSTOLIC BLOOD PRESSURE: 118 MMHG | DIASTOLIC BLOOD PRESSURE: 60 MMHG | TEMPERATURE: 97.7 F | OXYGEN SATURATION: 98 % | WEIGHT: 202 LBS | HEART RATE: 62 BPM | HEIGHT: 70 IN

## 2024-04-11 DIAGNOSIS — Z63.6 CAREGIVER STRESS: ICD-10-CM

## 2024-04-11 DIAGNOSIS — Z23 NEED FOR VACCINATION: ICD-10-CM

## 2024-04-11 DIAGNOSIS — F10.10 ALCOHOL ABUSE: ICD-10-CM

## 2024-04-11 DIAGNOSIS — R74.01 TRANSAMINITIS: Primary | ICD-10-CM

## 2024-04-11 DIAGNOSIS — R03.0 ELEVATED BLOOD PRESSURE READING: ICD-10-CM

## 2024-04-11 DIAGNOSIS — K76.0 STEATOSIS OF LIVER: ICD-10-CM

## 2024-04-11 LAB
ALBUMIN SERPL-MCNC: 4.3 G/DL (ref 3.5–5.2)
ALBUMIN/GLOB SERPL: 1.7 G/DL
ALP SERPL-CCNC: 79 U/L (ref 39–117)
ALT SERPL-CCNC: 33 U/L (ref 1–41)
AST SERPL-CCNC: 38 U/L (ref 1–40)
BILIRUB SERPL-MCNC: 0.8 MG/DL (ref 0–1.2)
BUN SERPL-MCNC: 6 MG/DL (ref 8–23)
BUN/CREAT SERPL: 5.3 (ref 7–25)
CALCIUM SERPL-MCNC: 9.4 MG/DL (ref 8.6–10.5)
CHLORIDE SERPL-SCNC: 104 MMOL/L (ref 98–107)
CO2 SERPL-SCNC: 23 MMOL/L (ref 22–29)
CREAT SERPL-MCNC: 1.14 MG/DL (ref 0.76–1.27)
EGFRCR SERPLBLD CKD-EPI 2021: 67.5 ML/MIN/1.73
GLOBULIN SER CALC-MCNC: 2.5 GM/DL
GLUCOSE SERPL-MCNC: 98 MG/DL (ref 65–99)
POTASSIUM SERPL-SCNC: 4.1 MMOL/L (ref 3.5–5.2)
PROT SERPL-MCNC: 6.8 G/DL (ref 6–8.5)
SODIUM SERPL-SCNC: 139 MMOL/L (ref 136–145)

## 2024-04-11 PROCEDURE — 90677 PCV20 VACCINE IM: CPT | Performed by: INTERNAL MEDICINE

## 2024-04-11 PROCEDURE — 1159F MED LIST DOCD IN RCRD: CPT | Performed by: INTERNAL MEDICINE

## 2024-04-11 PROCEDURE — 90715 TDAP VACCINE 7 YRS/> IM: CPT | Performed by: INTERNAL MEDICINE

## 2024-04-11 PROCEDURE — 99214 OFFICE O/P EST MOD 30 MIN: CPT | Performed by: INTERNAL MEDICINE

## 2024-04-11 PROCEDURE — 1160F RVW MEDS BY RX/DR IN RCRD: CPT | Performed by: INTERNAL MEDICINE

## 2024-04-11 PROCEDURE — 90471 IMMUNIZATION ADMIN: CPT | Performed by: INTERNAL MEDICINE

## 2024-04-11 PROCEDURE — G0009 ADMIN PNEUMOCOCCAL VACCINE: HCPCS | Performed by: INTERNAL MEDICINE

## 2024-04-11 SDOH — SOCIAL STABILITY - SOCIAL INSECURITY: DEPENDENT RELATIVE NEEDING CARE AT HOME: Z63.6

## 2024-04-11 NOTE — PROGRESS NOTES
"      Gilbert Jimenez is a 74 y.o. male who presents with a chief complaint of   Chief Complaint   Patient presents with    Transaminitis     F/u    Steatosis of liver       HPI     BP doing well today.  Feels like he is settling in to FPC.     Wife is still struggling with dementia and it is worsening.       The following portions of the patient's history were reviewed and updated as appropriate: allergies, current medications, past family history, past medical history, past social history, past surgical history and problem list.      Current Outpatient Medications:     aspirin 81 MG chewable tablet, Chew 1 tablet As Needed., Disp: , Rfl:             Physical Exam  /60 (BP Location: Left arm, Patient Position: Sitting, Cuff Size: Adult)   Pulse 62   Temp 97.7 °F (36.5 °C) (Infrared)   Ht 177.8 cm (70\")   Wt 91.6 kg (202 lb)   SpO2 98%   BMI 28.98 kg/m²     Physical Exam  Vitals reviewed.   Constitutional:       General: He is not in acute distress.     Appearance: Normal appearance.   HENT:      Head: Normocephalic and atraumatic.      Nose: Nose normal.      Mouth/Throat:      Mouth: Mucous membranes are moist.   Eyes:      Conjunctiva/sclera: Conjunctivae normal.   Pulmonary:      Effort: Pulmonary effort is normal.   Skin:     General: Skin is warm and dry.   Neurological:      General: No focal deficit present.      Mental Status: He is alert.   Psychiatric:         Mood and Affect: Mood normal.         Results for orders placed or performed in visit on 10/11/23   Comprehensive Metabolic Panel    Specimen: Blood   Result Value Ref Range    Glucose 97 65 - 99 mg/dL    BUN 7 (L) 8 - 23 mg/dL    Creatinine 1.02 0.76 - 1.27 mg/dL    EGFR Result 77.6 >60.0 mL/min/1.73    BUN/Creatinine Ratio 6.9 (L) 7.0 - 25.0    Sodium 140 136 - 145 mmol/L    Potassium 3.8 3.5 - 5.2 mmol/L    Chloride 104 98 - 107 mmol/L    Total CO2 22.9 22.0 - 29.0 mmol/L    Calcium 9.9 8.6 - 10.5 mg/dL    Total Protein 6.9 6.0 " - 8.5 g/dL    Albumin 4.7 3.5 - 5.2 g/dL    Globulin 2.2 gm/dL    A/G Ratio 2.1 g/dL    Total Bilirubin 2.0 (H) 0.0 - 1.2 mg/dL    Alkaline Phosphatase 70 39 - 117 U/L    AST (SGOT) 53 (H) 1 - 40 U/L    ALT (SGPT) 55 (H) 1 - 41 U/L   Lipid Panel With LDL / HDL Ratio    Specimen: Blood   Result Value Ref Range    Total Cholesterol 154 0 - 200 mg/dL    Triglycerides 120 0 - 150 mg/dL    HDL Cholesterol 87 (H) 40 - 60 mg/dL    VLDL Cholesterol Brandon 20 5 - 40 mg/dL    LDL Chol Calc (NIH) 47 0 - 100 mg/dL    LDL/HDL RATIO 0.49    Hemoglobin A1c    Specimen: Blood   Result Value Ref Range    Hemoglobin A1C 5.30 4.80 - 5.60 %   CBC & Differential    Specimen: Blood   Result Value Ref Range    WBC 9.31 3.40 - 10.80 10*3/mm3    RBC 4.57 4.14 - 5.80 10*6/mm3    Hemoglobin 16.2 13.0 - 17.7 g/dL    Hematocrit 46.2 37.5 - 51.0 %    .1 (H) 79.0 - 97.0 fL    MCH 35.4 (H) 26.6 - 33.0 pg    MCHC 35.1 31.5 - 35.7 g/dL    RDW 12.2 (L) 12.3 - 15.4 %    Platelets 235 140 - 450 10*3/mm3    Neutrophil Rel % 65.9 42.7 - 76.0 %    Lymphocyte Rel % 23.3 19.6 - 45.3 %    Monocyte Rel % 9.0 5.0 - 12.0 %    Eosinophil Rel % 1.1 0.3 - 6.2 %    Basophil Rel % 0.5 0.0 - 1.5 %    Neutrophils Absolute 6.13 1.70 - 7.00 10*3/mm3    Lymphocytes Absolute 2.17 0.70 - 3.10 10*3/mm3    Monocytes Absolute 0.84 0.10 - 0.90 10*3/mm3    Eosinophils Absolute 0.10 0.00 - 0.40 10*3/mm3    Basophils Absolute 0.05 0.00 - 0.20 10*3/mm3    Immature Granulocyte Rel % 0.2 0.0 - 0.5 %    Immature Grans Absolute 0.02 0.00 - 0.05 10*3/mm3    nRBC 0.0 0.0 - 0.2 /100 WBC           Diagnoses and all orders for this visit:    1. Transaminitis (Primary)  -     Comprehensive Metabolic Panel    2. Steatosis of liver  -     Comprehensive Metabolic Panel    3. Alcohol abuse  -     Comprehensive Metabolic Panel    4. Caregiver stress    5. Elevated blood pressure reading    6. Need for vaccination  -     Tdap Vaccine Greater Than or Equal To 6yo IM  -     Pneumococcal  Conjugate Vaccine 20-Valent All        BP much better today.  Really enjoying the yard work.      Re-check LFTs today.     Declines AAA screening.  Agreeable to Prevnar 20 and TDAP today.      F/u in 6 months for AWV

## 2024-05-06 ENCOUNTER — PATIENT MESSAGE (OUTPATIENT)
Dept: INTERNAL MEDICINE | Facility: CLINIC | Age: 74
End: 2024-05-06
Payer: COMMERCIAL

## 2024-05-06 DIAGNOSIS — L30.9 DERMATITIS: Primary | ICD-10-CM

## 2024-05-07 ENCOUNTER — TELEPHONE (OUTPATIENT)
Dept: INTERNAL MEDICINE | Facility: CLINIC | Age: 74
End: 2024-05-07

## 2024-05-07 NOTE — TELEPHONE ENCOUNTER
"Caller: Gilbert Jimenez \"Popeye\"    Relationship: Self    Best call back number: [phone]     What are your current symptoms: BUG BITES    How long have you been experiencing symptoms: FEW DAYS    If a prescription is needed, what is your preferred pharmacy and phone number: [facility] [id number] - [place], [place] - [address] - [phone]  - [phone]     Additional notes: PATIENT STATES THAT HE GOT INTO SOME ALL GRASS, AND NOW HAS SEVERE CHIGGER BITES. REQUESTS PRESCRIPTION TO TREAT FURTHER. CALL AND ADVISE IF ADDITIONAL FOLLOW UP NEEDED      "

## 2024-05-09 RX ORDER — TRIAMCINOLONE ACETONIDE 0.25 MG/G
1 CREAM TOPICAL 2 TIMES DAILY
Qty: 80 G | Refills: 1 | Status: SHIPPED | OUTPATIENT
Start: 2024-05-09

## 2024-10-11 ENCOUNTER — OFFICE VISIT (OUTPATIENT)
Dept: INTERNAL MEDICINE | Facility: CLINIC | Age: 74
End: 2024-10-11
Payer: MEDICARE

## 2024-10-11 VITALS
WEIGHT: 193.6 LBS | TEMPERATURE: 97.8 F | HEIGHT: 70 IN | DIASTOLIC BLOOD PRESSURE: 80 MMHG | SYSTOLIC BLOOD PRESSURE: 138 MMHG | BODY MASS INDEX: 27.72 KG/M2 | HEART RATE: 66 BPM | OXYGEN SATURATION: 98 %

## 2024-10-11 DIAGNOSIS — Z23 NEED FOR VACCINATION: ICD-10-CM

## 2024-10-11 DIAGNOSIS — F10.10 ALCOHOL ABUSE: ICD-10-CM

## 2024-10-11 DIAGNOSIS — F33.1 MODERATE EPISODE OF RECURRENT MAJOR DEPRESSIVE DISORDER: ICD-10-CM

## 2024-10-11 DIAGNOSIS — R73.9 HYPERGLYCEMIA: ICD-10-CM

## 2024-10-11 DIAGNOSIS — Z63.6 CAREGIVER STRESS: ICD-10-CM

## 2024-10-11 DIAGNOSIS — K76.0 STEATOSIS OF LIVER: ICD-10-CM

## 2024-10-11 DIAGNOSIS — Z00.00 MEDICARE ANNUAL WELLNESS VISIT, SUBSEQUENT: Primary | ICD-10-CM

## 2024-10-11 PROCEDURE — 1159F MED LIST DOCD IN RCRD: CPT | Performed by: INTERNAL MEDICINE

## 2024-10-11 PROCEDURE — 1126F AMNT PAIN NOTED NONE PRSNT: CPT | Performed by: INTERNAL MEDICINE

## 2024-10-11 PROCEDURE — 90662 IIV NO PRSV INCREASED AG IM: CPT | Performed by: INTERNAL MEDICINE

## 2024-10-11 PROCEDURE — G0439 PPPS, SUBSEQ VISIT: HCPCS | Performed by: INTERNAL MEDICINE

## 2024-10-11 PROCEDURE — G0008 ADMIN INFLUENZA VIRUS VAC: HCPCS | Performed by: INTERNAL MEDICINE

## 2024-10-11 PROCEDURE — 1160F RVW MEDS BY RX/DR IN RCRD: CPT | Performed by: INTERNAL MEDICINE

## 2024-10-11 RX ORDER — MULTIVIT WITH MINERALS/LUTEIN
250 TABLET ORAL DAILY
COMMUNITY

## 2024-10-11 RX ORDER — CHLORAL HYDRATE 500 MG
CAPSULE ORAL
COMMUNITY

## 2024-10-11 RX ORDER — ERGOCALCIFEROL (VITAMIN D2) 10 MCG
400 TABLET ORAL DAILY
COMMUNITY

## 2024-10-11 RX ORDER — VITAMIN B COMPLEX
CAPSULE ORAL DAILY
COMMUNITY

## 2024-10-11 SDOH — SOCIAL STABILITY - SOCIAL INSECURITY: DEPENDENT RELATIVE NEEDING CARE AT HOME: Z63.6

## 2024-10-11 NOTE — ASSESSMENT & PLAN NOTE
Has cut back on alcohol use.  He does still drink when his wife has an especially bad day.  Advised continuing to cut back.  Check labs today.

## 2024-10-11 NOTE — ASSESSMENT & PLAN NOTE
Discussed challenges of care giving his wife who has severe dementia.  She is currently dreaming things and believing them to be real.

## 2024-10-11 NOTE — PROGRESS NOTES
Subjective   The ABCs of the Annual Wellness Visit  Medicare Wellness Visit      Gilbert Jimenez is a 74 y.o. patient who presents for a Medicare Wellness Visit.    The following portions of the patient's history were reviewed and   updated as appropriate: allergies, current medications, past family history, past medical history, past social history, past surgical history, and problem list.    Compared to one year ago, the patient's physical   health is the same.  Compared to one year ago, the patient's mental   health is worse.    Recent Hospitalizations:  He was not admitted to the hospital during the last year.     Current Medical Providers:  Patient Care Team:  Lorene Kelly MD as PCP - General (Internal Medicine)  Suri Castle APRN (Dermatology)  -Christina Ross OD (Optometry)  Haseeb Osborne MD (Dental General Practice)    Outpatient Medications Prior to Visit   Medication Sig Dispense Refill    aspirin 81 MG chewable tablet Chew 1 tablet As Needed.      B Complex Vitamins (vitamin b complex) capsule capsule Take  by mouth Daily.      Omega-3 Fatty Acids (fish oil) 1000 MG capsule capsule Take  by mouth Daily With Breakfast.      vitamin C (ASCORBIC ACID) 250 MG tablet Take 1 tablet by mouth Daily.      Vitamin D, Cholecalciferol, (CHOLECALCIFEROL) 10 MCG (400 UNIT) tablet Take 1 tablet by mouth Daily.      predniSONE (DELTASONE) 10 MG tablet 6 tabs days 1&2, 4 tabs days 3&4, 2 tabs days 5&6, 1 tab days 7&8, 1/2 tab days 9&10 then dc 27 tablet 0    triamcinolone (KENALOG) 0.025 % cream Apply 1 Application topically to the appropriate area as directed 2 (Two) Times a Day. 80 g 1     No facility-administered medications prior to visit.     No opioid medication identified on active medication list. I have reviewed chart for other potential  high risk medication/s and harmful drug interactions in the elderly.      Aspirin is on active medication list. Aspirin use is indicated based on review of  "current medical condition/s. Pros and cons of this therapy have been discussed today. Benefits of this medication outweigh potential harm.  Patient has been encouraged to continue taking this medication.  .      Patient Active Problem List   Diagnosis    Fatigue    Chest pain    Weight loss, unintentional    Rash and nonspecific skin eruption    Overweight    Diarrhea    Current moderate episode of major depressive disorder without prior episode    Alcohol abuse    Caregiver stress    Transaminitis    Steatosis of liver    Elevated blood pressure reading     Advance Care Planning Advance Directive is not on file.  ACP discussion was held with the patient during this visit. Patient has an advance directive (not in EMR), copy requested.            Objective   Vitals:    10/11/24 0937   BP: 138/80   BP Location: Left arm   Patient Position: Sitting   Cuff Size: Adult   Pulse: 66   Temp: 97.8 °F (36.6 °C)   TempSrc: Infrared   SpO2: 98%   Weight: 87.8 kg (193 lb 9.6 oz)   Height: 177.8 cm (70\")   PainSc: 0-No pain       Estimated body mass index is 27.78 kg/m² as calculated from the following:    Height as of this encounter: 177.8 cm (70\").    Weight as of this encounter: 87.8 kg (193 lb 9.6 oz).    BMI is >= 25 and <30. (Overweight) The following options were offered after discussion;: exercise counseling/recommendations and nutrition counseling/recommendations       Does the patient have evidence of cognitive impairment? No  Lab Results   Component Value Date    CHLPL 181 10/11/2024    TRIG 146 10/11/2024    HDL 73 (H) 10/11/2024    LDL 83 10/11/2024    VLDL 25 10/11/2024    HGBA1C 5.00 10/11/2024                                                                                               Health  Risk Assessment    Smoking Status:  Social History     Tobacco Use   Smoking Status Former    Current packs/day: 0.00    Average packs/day: 0.2 packs/day for 6.0 years (1.2 ttl pk-yrs)    Types: Cigarettes    Start date: " 1969    Quit date:     Years since quittin.8    Passive exposure: Past   Smokeless Tobacco Never     Alcohol Consumption:  Social History     Substance and Sexual Activity   Alcohol Use Yes    Alcohol/week: 4.0 standard drinks of alcohol    Types: 4 Cans of beer per week    Comment: daily       Fall Risk Screen  FERNANDO Fall Risk Assessment was completed, and patient is at LOW risk for falls.Assessment completed on:10/11/2024    Depression Screening:      10/11/2024     9:47 AM   PHQ-2/PHQ-9 Depression Screening   Retired Little Interest or Pleasure in Doing Things 1-->several days   Retired Feeling Down, Depressed or Hopeless 0-->not at all   Retired PHQ-9: Brief Depression Severity Measure Score 1     Health Habits and Functional and Cognitive Screening:      10/11/2024     9:38 AM   Functional & Cognitive Status   Do you have difficulty preparing food and eating? No   Do you have difficulty bathing yourself, getting dressed or grooming yourself? No   Do you have difficulty using the toilet? No   Do you have difficulty moving around from place to place? No   Do you have trouble with steps or getting out of a bed or a chair? No   Current Diet Low Fat Diet   Dental Exam Up to date   Eye Exam Not up to date   Exercise (times per week) 0 times per week   Current Exercises Include No Regular Exercise   Do you need help using the phone?  No   Are you deaf or do you have serious difficulty hearing?  No   Do you need help to go to places out of walking distance? No   Do you need help shopping? No   Do you need help preparing meals?  No   Do you need help with housework?  No   Do you need help with laundry? No   Do you need help taking your medications? No   Do you need help managing money? No   Do you ever drive or ride in a car without wearing a seat belt? No   Have you felt unusual stress, anger or loneliness in the last month? No   Who do you live with? Spouse   If you need help, do you have trouble finding  someone available to you? No   Have you been bothered in the last four weeks by sexual problems? No   Do you have difficulty concentrating, remembering or making decisions? No           Age-appropriate Screening Schedule:  Refer to the list below for future screening recommendations based on patient's age, sex and/or medical conditions. Orders for these recommended tests are listed in the plan section. The patient has been provided with a written plan.    Health Maintenance List  Health Maintenance   Topic Date Due    ZOSTER VACCINE (1 of 2) Never done    HEPATITIS C SCREENING  Never done    AAA SCREEN (ONE-TIME)  Never done    COVID-19 Vaccine (2 - 2023-24 season) 09/01/2024    BMI FOLLOWUP  10/11/2024    COLORECTAL CANCER SCREENING  08/23/2025    ANNUAL WELLNESS VISIT  10/11/2025    TDAP/TD VACCINES (2 - Td or Tdap) 04/11/2034    INFLUENZA VACCINE  Completed    Pneumococcal Vaccine 65+  Completed                                                                                                                                                CMS Preventative Services Quick Reference  Risk Factors Identified During Encounter  Caregiver stress and alcohol use are my main concerns for him. Discussed possibility of medication for depression or counseling.    The above risks/problems have been discussed with the patient.  Pertinent information has been shared with the patient in the After Visit Summary.  An After Visit Summary and PPPS were made available to the patient.    Follow Up:   Next Medicare Wellness visit to be scheduled in 1 year.     Assessment & Plan  Medicare annual wellness visit, subsequent    Need for vaccination    Caregiver stress  Discussed challenges of care giving his wife who has severe dementia.  She is currently dreaming things and believing them to be real.   Moderate episode of recurrent major depressive disorder  Not ready for medicine or counseling at this time.  Advised I will continue to  monitor him and support him whenever he is ready for treatment.   Alcohol abuse  Has cut back on alcohol use.  He does still drink when his wife has an especially bad day.  Advised continuing to cut back.  Check labs today.  Steatosis of liver    Hyperglycemia      Orders Placed This Encounter   Procedures    Fluzone High-Dose 65+yrs    Comprehensive Metabolic Panel    CBC (No Diff)    Lipid Panel With LDL / HDL Ratio    Hemoglobin A1c             Follow Up:   Return in about 6 months (around 4/11/2025) for f/u chronic medical conditions.

## 2024-10-11 NOTE — ASSESSMENT & PLAN NOTE
Not ready for medicine or counseling at this time.  Advised I will continue to monitor him and support him whenever he is ready for treatment.

## 2024-10-12 LAB
ALBUMIN SERPL-MCNC: 4.1 G/DL (ref 3.5–5.2)
ALBUMIN/GLOB SERPL: 2 G/DL
ALP SERPL-CCNC: 63 U/L (ref 39–117)
ALT SERPL-CCNC: 35 U/L (ref 1–41)
AST SERPL-CCNC: 40 U/L (ref 1–40)
BILIRUB SERPL-MCNC: 1.2 MG/DL (ref 0–1.2)
BUN SERPL-MCNC: 9 MG/DL (ref 8–23)
BUN/CREAT SERPL: 8.7 (ref 7–25)
CALCIUM SERPL-MCNC: 9 MG/DL (ref 8.6–10.5)
CHLORIDE SERPL-SCNC: 108 MMOL/L (ref 98–107)
CHOLEST SERPL-MCNC: 181 MG/DL (ref 0–200)
CO2 SERPL-SCNC: 23.7 MMOL/L (ref 22–29)
CREAT SERPL-MCNC: 1.03 MG/DL (ref 0.76–1.27)
EGFRCR SERPLBLD CKD-EPI 2021: 76.2 ML/MIN/1.73
ERYTHROCYTE [DISTWIDTH] IN BLOOD BY AUTOMATED COUNT: 11.6 % (ref 12.3–15.4)
GLOBULIN SER CALC-MCNC: 2.1 GM/DL
GLUCOSE SERPL-MCNC: 94 MG/DL (ref 65–99)
HBA1C MFR BLD: 5 % (ref 4.8–5.6)
HCT VFR BLD AUTO: 41 % (ref 37.5–51)
HDLC SERPL-MCNC: 73 MG/DL (ref 40–60)
HGB BLD-MCNC: 15.1 G/DL (ref 13–17.7)
LDLC SERPL CALC-MCNC: 83 MG/DL (ref 0–100)
LDLC/HDLC SERPL: 1.08 {RATIO}
MCH RBC QN AUTO: 40.3 PG (ref 26.6–33)
MCHC RBC AUTO-ENTMCNC: 36.8 G/DL (ref 31.5–35.7)
MCV RBC AUTO: 109.3 FL (ref 79–97)
PLATELET # BLD AUTO: 160 10*3/MM3 (ref 140–450)
POTASSIUM SERPL-SCNC: 4 MMOL/L (ref 3.5–5.2)
PROT SERPL-MCNC: 6.2 G/DL (ref 6–8.5)
RBC # BLD AUTO: 3.75 10*6/MM3 (ref 4.14–5.8)
SODIUM SERPL-SCNC: 144 MMOL/L (ref 136–145)
TRIGL SERPL-MCNC: 146 MG/DL (ref 0–150)
VLDLC SERPL CALC-MCNC: 25 MG/DL (ref 5–40)
WBC # BLD AUTO: 7.53 10*3/MM3 (ref 3.4–10.8)

## 2024-10-21 ENCOUNTER — TELEPHONE (OUTPATIENT)
Dept: INTERNAL MEDICINE | Facility: CLINIC | Age: 74
End: 2024-10-21

## 2024-10-21 NOTE — TELEPHONE ENCOUNTER
"Hub staff attempted to follow warm transfer process and was unsuccessful     Caller: Gilbert Jimenez \"Popeye\"    Relationship to patient: Self    Best call back number: [phone]    Patient is needing: PT RETURNING MISSED CALL ABOUT TEST RESULTS.       "

## 2025-04-15 ENCOUNTER — OFFICE VISIT (OUTPATIENT)
Dept: INTERNAL MEDICINE | Facility: CLINIC | Age: 75
End: 2025-04-15
Payer: MEDICARE

## 2025-04-15 VITALS
WEIGHT: 192 LBS | BODY MASS INDEX: 28.44 KG/M2 | HEART RATE: 57 BPM | HEIGHT: 69 IN | TEMPERATURE: 98.7 F | RESPIRATION RATE: 16 BRPM | OXYGEN SATURATION: 99 % | DIASTOLIC BLOOD PRESSURE: 72 MMHG | SYSTOLIC BLOOD PRESSURE: 130 MMHG

## 2025-04-15 DIAGNOSIS — Z63.6 CAREGIVER STRESS: Primary | ICD-10-CM

## 2025-04-15 DIAGNOSIS — E66.3 OVERWEIGHT: ICD-10-CM

## 2025-04-15 DIAGNOSIS — K76.0 STEATOSIS OF LIVER: ICD-10-CM

## 2025-04-15 DIAGNOSIS — R71.8 ELEVATED MCV: ICD-10-CM

## 2025-04-15 PROCEDURE — 1159F MED LIST DOCD IN RCRD: CPT | Performed by: INTERNAL MEDICINE

## 2025-04-15 PROCEDURE — 1126F AMNT PAIN NOTED NONE PRSNT: CPT | Performed by: INTERNAL MEDICINE

## 2025-04-15 PROCEDURE — 1160F RVW MEDS BY RX/DR IN RCRD: CPT | Performed by: INTERNAL MEDICINE

## 2025-04-15 PROCEDURE — G2211 COMPLEX E/M VISIT ADD ON: HCPCS | Performed by: INTERNAL MEDICINE

## 2025-04-15 PROCEDURE — 99214 OFFICE O/P EST MOD 30 MIN: CPT | Performed by: INTERNAL MEDICINE

## 2025-04-15 SDOH — SOCIAL STABILITY - SOCIAL INSECURITY: DEPENDENT RELATIVE NEEDING CARE AT HOME: Z63.6

## 2025-04-15 NOTE — PROGRESS NOTES
"Chief Complaint  Depression, Elevated blood pressure (Patient is fasting), and Anxiety (Affecting daily life)    Subjective        Gilbert Jimenez presents to [facility]  History of Present Illness    Mostly discussed caregiver stresses and challenges around that.  Mostly doing well.    Having some abdominal cramping when he bends over to tie his shoes.     Objective   Vital Signs:  /72 (BP Location: Left arm, Patient Position: Sitting, Cuff Size: Large Adult)   Pulse 57   Temp 98.7 °F (37.1 °C) (Infrared)   Resp 16   Ht 175.3 cm (69\")   Wt 87.1 kg (192 lb)   SpO2 99%   BMI 28.35 kg/m²   Estimated body mass index is 28.35 kg/m² as calculated from the following:    Height as of this encounter: 175.3 cm (69\").    Weight as of this encounter: 87.1 kg (192 lb).            Physical Exam  Vitals reviewed.   Constitutional:       General: He is not in acute distress.     Appearance: Normal appearance.   HENT:      Head: Normocephalic and atraumatic.      Nose: Nose normal.      Mouth/Throat:      Mouth: Mucous membranes are moist.   Eyes:      Conjunctiva/sclera: Conjunctivae normal.   Pulmonary:      Effort: Pulmonary effort is normal.   Skin:     General: Skin is warm and dry.   Neurological:      General: No focal deficit present.      Mental Status: He is alert.   Psychiatric:         Mood and Affect: Mood normal.        Result Review :  The following data was reviewed by: Lorene Kelly MD on 04/15/2025:  Common labs          10/11/2024    10:17 4/15/2025    09:13   Common Labs   Glucose 94  94    BUN 9  8    Creatinine 1.03  1.19    Sodium 144  142    Potassium 4.0  3.8    Chloride 108  106    Calcium 9.0  9.5    Albumin 4.1  4.3    Total Bilirubin 1.2  1.7    Alkaline Phosphatase 63  65    AST (SGOT) 40  39    ALT (SGPT) 35  34    WBC 7.53  7.03    Hemoglobin 15.1  16.2    Hematocrit 41.0  45.9    Platelets 160  163    Total Cholesterol 181     Triglycerides 146     HDL " Cholesterol 73     LDL Cholesterol  83     Hemoglobin A1C 5.00       Data reviewed : no new data to review           Assessment and Plan   Diagnoses and all orders for this visit:    1. Caregiver stress (Primary)    2. Steatosis of liver  -     Comprehensive Metabolic Panel  -     CBC (No Diff)  -     Protime-INR    3. Overweight    4. Elevated MCV  -     CBC (No Diff)  -     Vitamin B12  -     Folate      Largely doing okay, but his wife's dementia is worsening.  Eval labs and follow-up in 6 months         Follow Up   Return in about 6 months (around 10/15/2025) for Medicare Wellness.  Patient was given instructions and counseling regarding his condition or for health maintenance advice. Please see specific information pulled into the AVS if appropriate.

## 2025-04-16 ENCOUNTER — RESULTS FOLLOW-UP (OUTPATIENT)
Dept: INTERNAL MEDICINE | Facility: CLINIC | Age: 75
End: 2025-04-16
Payer: COMMERCIAL

## 2025-04-16 DIAGNOSIS — E53.8 B12 DEFICIENCY: Primary | ICD-10-CM

## 2025-04-16 LAB
ALBUMIN SERPL-MCNC: 4.3 G/DL (ref 3.5–5.2)
ALBUMIN/GLOB SERPL: 1.7 G/DL
ALP SERPL-CCNC: 65 U/L (ref 39–117)
ALT SERPL-CCNC: 34 U/L (ref 1–41)
AST SERPL-CCNC: 39 U/L (ref 1–40)
BILIRUB SERPL-MCNC: 1.7 MG/DL (ref 0–1.2)
BUN SERPL-MCNC: 8 MG/DL (ref 8–23)
BUN/CREAT SERPL: 6.7 (ref 7–25)
CALCIUM SERPL-MCNC: 9.5 MG/DL (ref 8.6–10.5)
CHLORIDE SERPL-SCNC: 106 MMOL/L (ref 98–107)
CO2 SERPL-SCNC: 22.9 MMOL/L (ref 22–29)
CREAT SERPL-MCNC: 1.19 MG/DL (ref 0.76–1.27)
EGFRCR SERPLBLD CKD-EPI 2021: 63.7 ML/MIN/1.73
ERYTHROCYTE [DISTWIDTH] IN BLOOD BY AUTOMATED COUNT: 11.8 % (ref 12.3–15.4)
FOLATE SERPL-MCNC: >20 NG/ML (ref 4.78–24.2)
GLOBULIN SER CALC-MCNC: 2.6 GM/DL
GLUCOSE SERPL-MCNC: 94 MG/DL (ref 65–99)
HCT VFR BLD AUTO: 45.9 % (ref 37.5–51)
HGB BLD-MCNC: 16.2 G/DL (ref 13–17.7)
INR PPP: 1.17 (ref 0.9–1.1)
MCH RBC QN AUTO: 36.6 PG (ref 26.6–33)
MCHC RBC AUTO-ENTMCNC: 35.3 G/DL (ref 31.5–35.7)
MCV RBC AUTO: 103.6 FL (ref 79–97)
PLATELET # BLD AUTO: 163 10*3/MM3 (ref 140–450)
POTASSIUM SERPL-SCNC: 3.8 MMOL/L (ref 3.5–5.2)
PROT SERPL-MCNC: 6.9 G/DL (ref 6–8.5)
PROTHROMBIN TIME: 14.8 SECONDS (ref 11.7–14.2)
RBC # BLD AUTO: 4.43 10*6/MM3 (ref 4.14–5.8)
SODIUM SERPL-SCNC: 142 MMOL/L (ref 136–145)
VIT B12 SERPL-MCNC: 241 PG/ML (ref 211–946)
WBC # BLD AUTO: 7.03 10*3/MM3 (ref 3.4–10.8)

## 2025-04-16 RX ORDER — LANOLIN ALCOHOL/MO/W.PET/CERES
1000 CREAM (GRAM) TOPICAL DAILY
Qty: 90 TABLET | Refills: 1 | Status: SHIPPED | OUTPATIENT
Start: 2025-04-16